# Patient Record
Sex: FEMALE | Race: WHITE | NOT HISPANIC OR LATINO | URBAN - METROPOLITAN AREA
[De-identification: names, ages, dates, MRNs, and addresses within clinical notes are randomized per-mention and may not be internally consistent; named-entity substitution may affect disease eponyms.]

---

## 2020-01-01 ENCOUNTER — OFFICE VISIT - HEALTHEAST (OUTPATIENT)
Dept: GERIATRICS | Facility: CLINIC | Age: 85
End: 2020-01-01

## 2020-01-01 ENCOUNTER — RECORDS - HEALTHEAST (OUTPATIENT)
Dept: LAB | Facility: CLINIC | Age: 85
End: 2020-01-01

## 2020-01-01 ENCOUNTER — COMMUNICATION - HEALTHEAST (OUTPATIENT)
Dept: GERIATRICS | Facility: CLINIC | Age: 85
End: 2020-01-01

## 2020-01-01 ENCOUNTER — AMBULATORY - HEALTHEAST (OUTPATIENT)
Dept: ADMINISTRATIVE | Facility: CLINIC | Age: 85
End: 2020-01-01

## 2020-01-01 DIAGNOSIS — F32.A DEPRESSIVE DISORDER: ICD-10-CM

## 2020-01-01 DIAGNOSIS — R53.81 PHYSICAL DECONDITIONING: ICD-10-CM

## 2020-01-01 DIAGNOSIS — F03.91 MAJOR NEUROCOGNITIVE DISORDER DUE TO ALZHEIMER'S DISEASE, PROBABLE, WITH BEHAVIORAL DISTURBANCE: ICD-10-CM

## 2020-01-01 DIAGNOSIS — I10 ESSENTIAL HYPERTENSION: ICD-10-CM

## 2020-01-01 DIAGNOSIS — F41.9 ANXIETY: ICD-10-CM

## 2020-01-01 DIAGNOSIS — R52 PAIN: ICD-10-CM

## 2020-01-01 DIAGNOSIS — Z79.899 MEDICATION MANAGEMENT: ICD-10-CM

## 2020-01-01 LAB
ALBUMIN SERPL-MCNC: 3.2 G/DL (ref 3.5–5)
ANION GAP SERPL CALCULATED.3IONS-SCNC: 9 MMOL/L (ref 5–18)
BASOPHILS # BLD AUTO: 0.1 THOU/UL (ref 0–0.2)
BASOPHILS NFR BLD AUTO: 1 % (ref 0–2)
BUN SERPL-MCNC: 11 MG/DL (ref 8–28)
CALCIUM SERPL-MCNC: 8.6 MG/DL (ref 8.5–10.5)
CHLORIDE BLD-SCNC: 101 MMOL/L (ref 98–107)
CO2 SERPL-SCNC: 22 MMOL/L (ref 22–31)
CREAT SERPL-MCNC: 0.81 MG/DL (ref 0.6–1.1)
EOSINOPHIL COUNT (ABSOLUTE): 0.1 THOU/UL (ref 0–0.4)
EOSINOPHIL NFR BLD AUTO: 2 % (ref 0–6)
ERYTHROCYTE [DISTWIDTH] IN BLOOD BY AUTOMATED COUNT: 22.6 % (ref 11–14.5)
GFR SERPL CREATININE-BSD FRML MDRD: >60 ML/MIN/1.73M2
GLUCOSE BLD-MCNC: 117 MG/DL (ref 70–125)
HCT VFR BLD AUTO: 31.3 % (ref 35–47)
HGB BLD-MCNC: 9.7 G/DL (ref 12–16)
IMMATURE GRANULOCYTE % - MAN (DIFF): 0 %
IMMATURE GRANULOCYTE ABSOLUTE - MAN (DIFF): 0 THOU/UL
LYMPHOCYTES # BLD AUTO: 0.8 THOU/UL (ref 0.8–4.4)
LYMPHOCYTES NFR BLD AUTO: 13 % (ref 20–40)
MCH RBC QN AUTO: 27.2 PG (ref 27–34)
MCHC RBC AUTO-ENTMCNC: 31 G/DL (ref 32–36)
MCV RBC AUTO: 88 FL (ref 80–100)
MONOCYTES # BLD AUTO: 1.7 THOU/UL (ref 0–0.9)
MONOCYTES NFR BLD AUTO: 26 % (ref 2–10)
OVALOCYTES: ABNORMAL
PHOSPHATE SERPL-MCNC: 3.3 MG/DL (ref 2.5–4.5)
PLAT MORPH BLD: NORMAL
PLATELET # BLD AUTO: 182 THOU/UL (ref 140–440)
PMV BLD AUTO: 10.4 FL (ref 8.5–12.5)
POLYCHROMASIA BLD QL SMEAR: ABNORMAL
POTASSIUM BLD-SCNC: 4.2 MMOL/L (ref 3.5–5)
RBC # BLD AUTO: 3.56 MILL/UL (ref 3.8–5.4)
SCHISTOCYTES: ABNORMAL
SODIUM SERPL-SCNC: 132 MMOL/L (ref 136–145)
TOTAL NEUTROPHILS-ABS(DIFF): 3.8 THOU/UL (ref 2–7.7)
TOTAL NEUTROPHILS-REL(DIFF): 59 % (ref 50–70)
WBC: 6.4 THOU/UL (ref 4–11)

## 2020-01-01 RX ORDER — HALOPERIDOL 0.5 MG/1
0.5 TABLET ORAL 2 TIMES DAILY
Status: SHIPPED | COMMUNITY
Start: 2020-01-01

## 2020-01-01 RX ORDER — FERROUS SULFATE 325(65) MG
1 TABLET ORAL 2 TIMES DAILY WITH MEALS
Status: SHIPPED | COMMUNITY
Start: 2020-01-01

## 2020-01-01 RX ORDER — POLYETHYLENE GLYCOL 3350 17 G/17G
17 POWDER, FOR SOLUTION ORAL DAILY PRN
Status: SHIPPED | COMMUNITY
Start: 2020-01-01

## 2020-01-01 RX ORDER — TRAZODONE HYDROCHLORIDE 50 MG/1
25 TABLET, FILM COATED ORAL DAILY
Status: SHIPPED | COMMUNITY
Start: 2020-01-01

## 2020-01-01 RX ORDER — PANTOPRAZOLE SODIUM 40 MG/1
40 TABLET, DELAYED RELEASE ORAL 2 TIMES DAILY
Status: SHIPPED | COMMUNITY
Start: 2020-01-01

## 2020-01-01 RX ORDER — ATENOLOL 50 MG/1
50 TABLET ORAL DAILY
Status: SHIPPED | COMMUNITY
Start: 2020-01-01

## 2020-01-01 RX ORDER — ACETAMINOPHEN 325 MG/1
650 TABLET ORAL 3 TIMES DAILY
Status: SHIPPED | COMMUNITY
Start: 2020-01-01

## 2020-01-01 RX ORDER — OXYCODONE HYDROCHLORIDE 5 MG/1
2.5 TABLET ORAL 3 TIMES DAILY
Qty: 60 TABLET | Refills: 0 | Status: SHIPPED | OUTPATIENT
Start: 2020-01-01

## 2020-01-01 RX ORDER — BISACODYL 10 MG
10 SUPPOSITORY, RECTAL RECTAL DAILY PRN
Status: SHIPPED | COMMUNITY
Start: 2020-01-01

## 2020-01-01 RX ORDER — ZINC OXIDE 216 MG/ML
8 LOTION TOPICAL 2 TIMES DAILY
Status: SHIPPED | COMMUNITY
Start: 2020-01-01

## 2021-01-01 ENCOUNTER — COMMUNICATION - HEALTHEAST (OUTPATIENT)
Dept: GERIATRICS | Facility: CLINIC | Age: 86
End: 2021-01-01

## 2021-01-01 ENCOUNTER — AMBULATORY - HEALTHEAST (OUTPATIENT)
Dept: GERIATRICS | Facility: CLINIC | Age: 86
End: 2021-01-01

## 2021-01-01 ENCOUNTER — OFFICE VISIT - HEALTHEAST (OUTPATIENT)
Dept: GERIATRICS | Facility: CLINIC | Age: 86
End: 2021-01-01

## 2021-01-01 ENCOUNTER — RECORDS - HEALTHEAST (OUTPATIENT)
Dept: LAB | Facility: CLINIC | Age: 86
End: 2021-01-01

## 2021-01-01 DIAGNOSIS — I10 ESSENTIAL HYPERTENSION: ICD-10-CM

## 2021-01-01 DIAGNOSIS — F03.91 MAJOR NEUROCOGNITIVE DISORDER DUE TO ALZHEIMER'S DISEASE, PROBABLE, WITH BEHAVIORAL DISTURBANCE: ICD-10-CM

## 2021-01-01 DIAGNOSIS — F32.A DEPRESSIVE DISORDER: ICD-10-CM

## 2021-01-01 DIAGNOSIS — Z51.5 HOSPICE CARE: ICD-10-CM

## 2021-01-01 DIAGNOSIS — R53.1 WEAKNESS: ICD-10-CM

## 2021-01-01 LAB
ANION GAP SERPL CALCULATED.3IONS-SCNC: 18 MMOL/L (ref 5–18)
BASOPHILS # BLD AUTO: 0.1 THOU/UL (ref 0–0.2)
BASOPHILS NFR BLD AUTO: 0 % (ref 0–2)
BUN SERPL-MCNC: 95 MG/DL (ref 8–28)
CALCIUM SERPL-MCNC: 10.2 MG/DL (ref 8.5–10.5)
CHLORIDE BLD-SCNC: 112 MMOL/L (ref 98–107)
CO2 SERPL-SCNC: 21 MMOL/L (ref 22–31)
CREAT SERPL-MCNC: 2.53 MG/DL (ref 0.6–1.1)
EOSINOPHIL # BLD AUTO: 0 THOU/UL (ref 0–0.4)
EOSINOPHIL NFR BLD AUTO: 0 % (ref 0–6)
ERYTHROCYTE [DISTWIDTH] IN BLOOD BY AUTOMATED COUNT: 13.6 % (ref 11–14.5)
GFR SERPL CREATININE-BSD FRML MDRD: 18 ML/MIN/1.73M2
GLUCOSE BLD-MCNC: 121 MG/DL (ref 70–125)
HCT VFR BLD AUTO: 46.9 % (ref 35–47)
HGB BLD-MCNC: 15.3 G/DL (ref 12–16)
IMM GRANULOCYTES # BLD: 0.6 THOU/UL
IMM GRANULOCYTES NFR BLD: 2 %
LYMPHOCYTES # BLD AUTO: 0.6 THOU/UL (ref 0.8–4.4)
LYMPHOCYTES NFR BLD AUTO: 2 % (ref 20–40)
MCH RBC QN AUTO: 33 PG (ref 27–34)
MCHC RBC AUTO-ENTMCNC: 32.6 G/DL (ref 32–36)
MCV RBC AUTO: 101 FL (ref 80–100)
MONOCYTES # BLD AUTO: 6 THOU/UL (ref 0–0.9)
MONOCYTES NFR BLD AUTO: 18 % (ref 2–10)
NEUTROPHILS # BLD AUTO: 26.5 THOU/UL (ref 2–7.7)
NEUTROPHILS NFR BLD AUTO: 78 % (ref 50–70)
PLATELET # BLD AUTO: 237 THOU/UL (ref 140–440)
PMV BLD AUTO: 11.5 FL (ref 8.5–12.5)
POTASSIUM BLD-SCNC: 3.8 MMOL/L (ref 3.5–5)
RBC # BLD AUTO: 4.64 MILL/UL (ref 3.8–5.4)
SODIUM SERPL-SCNC: 151 MMOL/L (ref 136–145)
WBC: 33.8 THOU/UL (ref 4–11)

## 2021-01-01 ASSESSMENT — MIFFLIN-ST. JEOR: SCORE: 866.05

## 2021-02-15 ENCOUNTER — AMBULATORY - HEALTHEAST (OUTPATIENT)
Dept: OTHER | Facility: CLINIC | Age: 86
End: 2021-02-15

## 2021-05-27 VITALS
OXYGEN SATURATION: 95 % | RESPIRATION RATE: 18 BRPM | DIASTOLIC BLOOD PRESSURE: 64 MMHG | TEMPERATURE: 98 F | HEART RATE: 69 BPM | SYSTOLIC BLOOD PRESSURE: 117 MMHG

## 2021-06-05 VITALS
WEIGHT: 106.7 LBS | TEMPERATURE: 97.2 F | HEIGHT: 64 IN | OXYGEN SATURATION: 93 % | SYSTOLIC BLOOD PRESSURE: 93 MMHG | DIASTOLIC BLOOD PRESSURE: 58 MMHG | HEART RATE: 80 BPM | RESPIRATION RATE: 20 BRPM | BODY MASS INDEX: 18.22 KG/M2

## 2021-06-05 VITALS
SYSTOLIC BLOOD PRESSURE: 104 MMHG | WEIGHT: 107 LBS | OXYGEN SATURATION: 95 % | TEMPERATURE: 98 F | HEART RATE: 78 BPM | RESPIRATION RATE: 18 BRPM | DIASTOLIC BLOOD PRESSURE: 64 MMHG

## 2021-06-12 NOTE — PROGRESS NOTES
"  [facility]   Video Visit    Code Status: DNR/DNI and POLST AVAILABLE    Yenni Javier is a 89 y.o. female who is being evaluated via a billable video visit.      The patient has been notified of following:     \"This video visit will be conducted via a call between you and your physician/provider. We have found that certain health care needs can be provided without the need for an in-person physical exam.  This service lets us provide the care you need with a video conversation.  If a prescription is necessary we can send it to the facility team.  If lab work is needed we can place an order through the facility team to have that test done at a later time.    If during the course of the call the physician/provider feels a video visit is not appropriate, you will not be charged for this service.\"     Physician/provider has received verbal consent for a Video Visit from the patient? Yes    Patient would like the video invitation sent by: Send to : [phone]    Video Start Time: 0952    CHIEF COMPLAINT/REASON FOR VISIT:  Chief Complaint   Patient presents with     Problem Visit     medication issues       HISTORY:      HPI: Yenni is a 89 y.o. 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  Yenni is a long-term care resident at [facility].    Today Yenni is being evaluated by request of consultant pharmacist for medication management.  Pharmacist is requesting a reduction in ferrous sulfate dosing.  This recommendation has been agreed upon.  However upon further evaluating medications and conditions it appears that she is quite overmedicated and sleepy.  She is very groggy and has multiple medications " contributing to sedation.  This includes an afternoon dose of trazodone.  An evening dose of trazodone.  Haldol twice daily and once at night.  Staff do not report any problematic behaviors.  Again, she is mostly sleeping due to the fact that she is clearly very medically sedated.  She also has other medications that do not seem to be appropriate for a woman of her age.  She is on scheduled oxycodone, 3 times daily.  Did discuss with the nurse who states she does believe she needs the pain medication at this time.  However it was unclear why she needed the pain medication.  We will address that at a later date.  Blood pressures have well controlled. We will attempt to wean off of hydralazine. She is also on a great deal of pantoprazole, which she does not have an indication for and has zero complaints. Never has had a GI bleed from what we can see in past medical history.  No history of Zavala's esophagus.  Uncertain why the heavy dosing, will attempt to wean.  Due to patient's advanced age and multiple comorbidities, will attempt weaning medication at a slow rate.  Yenni states that she is doing fine and is agreeable to this plan.  She states she has no concerns or issues to report.  Yenni denies any other concerns including fevers/chills, cough or cold symptoms, headaches, vision changes, chest pain/pressure, difficulty breathing, SOB, abdominal pain, nausea, vomiting, diarrhea, dysuria, increasing weakness, increasing pain.     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  Social History     Socioeconomic History     Marital status: Single     Spouse name:  Not on file     Number of children: Not on file     Years of education: Not on file     Highest education level: Not on file   Occupational History     Not on file   Social Needs     Financial resource strain: Not on file     Food insecurity     Worry: Not on file     Inability: Not on file     Transportation needs     Medical: Not on file     Non-medical: Not on file   Tobacco Use     Smoking status: Former Smoker     Smokeless tobacco: Never Used   Substance and Sexual Activity     Alcohol use: Not Currently     Drug use: Not on file     Sexual activity: Not on file   Lifestyle     Physical activity     Days per week: Not on file     Minutes per session: Not on file     Stress: Not on file   Relationships     Social connections     Talks on phone: Not on file     Gets together: Not on file     Attends Protestant service: Not on file     Active member of club or organization: Not on file     Attends meetings of clubs or organizations: Not on file     Relationship status: Not on file     Intimate partner violence     Fear of current or ex partner: Not on file     Emotionally abused: Not on file     Physically abused: Not on file     Forced sexual activity: Not on file   Other Topics Concern     Not on file   Social History Narrative     Not on file       REVIEW OF SYSTEM:  Per HPI    PHYSICAL EXAM:   /64   Pulse 78   Temp 98  F (36.7  C)   Resp 18   Wt 107 lb (48.5 kg)   SpO2 95%     A limited exam was performed due to recommendations for care during COVID-19 pandemic. Due to the 2020 COVID-19 pandemic, except as noted above, the patient was visually observed at a 6 foot plus distance.  An observational exam was performed in an effort to keep patient safe from COVID-19 and other communicable diseases.     General appearance: alert, appears stated age and cooperative  HEENT: Head is normocephalic with normal hair distribution. No evidence of trauma. Ears: Without lesions or deformity. No acute purulent  discharge. Eyes: Conjunctivae pink with no scleral icterus or erythema. Nose: Normal. Oropharnyx: mmm.  Lungs: respirations without effort.  Extremities: extremities normal, atraumatic, no cyanosis.  Skin: Skin color, texture normal. No rashes or lesions on exposed skin.   Neurologic: Grossly normal   Psych: interacts well with caregivers, exhibits logical thought processes and connections with evidence of dementia, pleasant.      LABS:   None today.    ASSESSMENT:      ICD-10-CM    1. Major neurocognitive disorder due to Alzheimer's disease, probable, with behavioral disturbance (H)  F01.51    2. Essential hypertension  I10    3. Medication management  Z79.899        PLAN:    Alzheimer's disease  -Discontinue afternoon dose of trazodone.  -Plan to adjust and taper Haldol.    Hypertension  -Decrease hydralazine to 10 mg by mouth 3 times daily.  We will plan to taper and discontinue this altogether if she tolerates it.  -Encouraged cardiac diet, low sodium diet, exercise and stress reduction techniques.   -Emphasized importance of blood pressure control.   -Continue current medications as prescribed.     Medication management  -Decrease pantoprazole to 40 mg by mouth once daily for 2 weeks, then decrease to 20 mg by mouth daily for 2 weeks, then decrease to 10 mg by mouth for 2 weeks, and then discontinue altogether.  -No indication for pantoprazole noted.  -Follow up per routine schedule or sooner with any complaints or concerns.    Otherwise continue current care plan for all other chronic medical conditions, as they are stable. Encouraged patient to engage in healthy lifestyle behaviors such as engaging in social activities, exercising (PT/OT), eating well, and following care plan. Follow up for routine check-up, or sooner if needed. Will continue to monitor patient and work with nursing staff collaboratively to work toward positive patient outcomes.    Video-Visit Details    Type of service:  Video Visit    Video  End Time (time video stopped): 10 AM    Originating Location (pt. Location):[facility] [[id number]]    Distant Location (provider location):  [facility]     Mode of Communication:  Alma Zhu CNP      Electronically signed by: Leanne Zhu CNP

## 2021-06-13 NOTE — PROGRESS NOTES
"  [facility]     Code Status: DNR/DNI and POLST AVAILABLE    CHIEF COMPLAINT/REASON FOR VISIT:  Chief Complaint   Patient presents with     Review Of Multiple Medical Conditions     Dementia, HTN, Depression, Anxiety       HISTORY:      HPI: Yenni is a 89 y.o. 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  Yenni is a long-term care resident at [facility].    Today Yenni is being evaluated for a routine review of multiple medical problems while in long term care. Yenni has significant dementia and therefore is unable to fully participate in ROS/HPI. She denies pain, asks \"what are you talking about?\". She otherwise will babble. Yenni has no concerns or issues that she is able to express. Nursing staff state that recent medication adjustments have been good. No side effects or other issues from these med changes. We will continue reduction of meds with Trazodone and hydralazine. Blood pressures have been very low-- less than 100 systolic. No problems noted with sleep.     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  Social History     Socioeconomic History     Marital status: Single     Spouse name: Not on file     Number of children: Not on file     Years of education: Not on file     Highest education level: Not on file   Occupational History "     Not on file   Social Needs     Financial resource strain: Not on file     Food insecurity     Worry: Not on file     Inability: Not on file     Transportation needs     Medical: Not on file     Non-medical: Not on file   Tobacco Use     Smoking status: Former Smoker     Smokeless tobacco: Never Used   Substance and Sexual Activity     Alcohol use: Not Currently     Drug use: Not on file     Sexual activity: Not on file   Lifestyle     Physical activity     Days per week: Not on file     Minutes per session: Not on file     Stress: Not on file   Relationships     Social connections     Talks on phone: Not on file     Gets together: Not on file     Attends Anabaptism service: Not on file     Active member of club or organization: Not on file     Attends meetings of clubs or organizations: Not on file     Relationship status: Not on file     Intimate partner violence     Fear of current or ex partner: Not on file     Emotionally abused: Not on file     Physically abused: Not on file     Forced sexual activity: Not on file   Other Topics Concern     Not on file   Social History Narrative     Not on file       REVIEW OF SYSTEM:  Per HPI    PHYSICAL EXAM:   /64   Pulse 69   Temp 98  F (36.7  C)   Resp 18   SpO2 95%     A limited exam was performed due to recommendations for care during COVID-19 pandemic. Due to the 2020 COVID-19 pandemic, except as noted above, the patient was visually observed at a 6 foot plus distance.  An observational exam was performed in an effort to keep patient safe from COVID-19 and other communicable diseases.     General appearance: alert, appears stated age and cooperative  HEENT: Head is normocephalic with normal hair distribution. No evidence of trauma. Ears: Without lesions or deformity. No acute purulent discharge. Eyes: Conjunctivae pink with no scleral icterus or erythema. Nose: Normal. Oropharnyx: mmm.  Lungs: respirations without effort.  Extremities: extremities normal,  atraumatic, no cyanosis.  Skin: Skin color, texture normal. No rashes or lesions on exposed skin.   Neurologic: Grossly normal   Psych: interacts well with caregivers, exhibits logical thought processes and connections with evidence of dementia, pleasant.      LABS:   None today.    ASSESSMENT:      ICD-10-CM    1. Essential hypertension  I10    2. Major neurocognitive disorder due to Alzheimer's disease, probable, with behavioral disturbance (H)  F01.51    3. Depressive disorder  F32.9    4. Anxiety  F41.9        PLAN:    Alzheimer's disease  -Discontinue trazodone.  -Plan to adjust and taper Haldol.    Hypertension  -Discontinue hydralazine.   -Atenolol 50 mg by mouth daily.   -Encouraged cardiac diet, low sodium diet, exercise and stress reduction techniques.   -Emphasized importance of blood pressure control.   -Continue current medications as prescribed.     Otherwise continue current care plan for all other chronic medical conditions, as they are stable. Encouraged patient to engage in healthy lifestyle behaviors such as engaging in social activities, exercising (PT/OT), eating well, and following care plan. Follow up for routine check-up, or sooner if needed. Will continue to monitor patient and work with nursing staff collaboratively to work toward positive patient outcomes.    Electronically signed by: Leanne Zhu CNP

## 2021-06-13 NOTE — PROGRESS NOTES
[facility]    Facility:   [facility] [[id number]]   Code Status: POLST AVAILABLE    Admission evaluation to long-term care of 89-year-old female.  [facility], due to advanced dementia patient is unable to provide any significant history.  Last hospitalization 6/1-7/14 at [facility], had fallen from bed and with complaints of back pain.  Hemoglobin 5 on admission, evaluated by gastroenterology, no endoscopic procedures performed, continued on ferrous sulfate for significant anemia and Protonix with potential gastric source of bleeding.  Major neurocognitive disorder present, significant agitation, insomnia, stay in [place] [facility] psychiat[facility] department, on Haldol 0.5 twice daily and 1 mg at at bedtime, trazodone 25 mg at daytime 50 mg at at bedtime with daytime trazodone recently discontinued, oxycodone 2.5 mg 3 times daily and 2.5 mg 3 times daily as needed for chronic pain.  Transferred to long-term care with inability to care for self.  Previous known history of hypertension, CT scan of head with mild to moderate diffuse parenchymal volume loss and periventricular low attention UA showing sequelae of chronic microvascular disease, depression, chronic back pain, DJD of multiple joints, hypercholesterolemia, cataracts.    Past medical history, current medical problem list, drug allergies, current medication list, social history reviewed in epic.  CODE STATUS reviewed in epic.  Patient does not recall family history.    Review of systems: Fatigue present.  Denies current pain.  Feels irritable, appetite adequate.  No chest pain palpitations orthopnea or PND.  No focal neurologic deficits.  Remainder of 12 point review of systems obtained negative.    Exam: In bed, drowsy, irritable, oriented to person, blood pressure 104/64, heart rate 78, temperature 98, O2 95%, respiratory 18.  Declines physical examination.  No facial  asymmetry.  Mucous membranes moist.  Moves arms without difficulty.  No HJR.  No evidence of labored breathing.    Impression and plan:   Advanced Alzheimer's type dementia, significant agitation previously present and with [facility] geriatric psychiatry stay, Haldol 0.5 mg twice daily and 1 mg at at bedtime to be continued, recent discontinuation of a.m. trazodone, continues trazodone 50 mg at at bedtime, no evidence of psychosis at this time.    Significant anemia in July with hemoglobin 5 on admission to hospital, previously on Protonix 40 mg twice daily recently decreased to 40 mg daily, GI evaluation in hospital without endoscopic procedure performed, suspected slow GI blood loss, follow hemoglobin, continue ferrous sulfate.    Chronic pain syndrome with history of chronic back pain, DJD of multiple joints, oxycodone tolerated, nursing staff report patient appears to require oxycodone for pain management, attempt taper over upcoming months.   Deconditioning, prefers solitary bed activity.    Previous history of depression, mood irritable, denies current depression, reliability of denial unclear.    Previous history of anxiety, does not appear anxious on this occasion.    History of cataract extraction.    Hypertension on hydralazine previously 20 mg 3 times daily now 10 mg 3 times daily, blood pressure currently satisfactory, continue to monitor.   Previous CORNELIO bilateral.    History of appendectomy and SUMMER with BSO.    Hospital records reviewed, recent medical notes reviewed, review of medications, review of multiple medical issues.      Electronically signed by: Silva Sanchez MD

## 2021-06-13 NOTE — TELEPHONE ENCOUNTER
[facility] Nurse Triage Telephone Note      Provider: Silva Sanchez MD  Facility: [facility]    Facility Type: LTC    Caller: Ky  Call Back Number:  [phone]    Allergies: Ciprofloxacin and Sulfa (sulfonamide antibiotics)    Reason for call: 1150am pt found on floor, she has a slight bruise on front of L knee. Denies pain.     Verbal Order/Direction given by Provider: Continue to monitor.    Provider giving order: Silva Sanchez MD    Verbal order given to: Ky Campos RN

## 2021-06-14 NOTE — TELEPHONE ENCOUNTER
[facility] Nurse Triage Telephone Note      Provider: KAROL Verma  Facility: [facility]    Facility Type: LTC    Caller: Mansoor   Call Back Number:  [phone]    Allergies: Ciprofloxacin and Sulfa (sulfonamide antibiotics)    Reason for call: Nursing calling stating that the pt is having low BP's without symptoms, her BP this afternoon was 80/49 p 85. She currently is on hydralazine 10mg three times a day and atenolol 50mg daily. Her BP's have been running lower lately in the low 100's and 90's/     Verbal Order/Direction given by Provider: discontinue the hydralazine, place a hold parameter to hold atenolol if SBP <100.      Provider giving order: KAROL Verma    Verbal order given to: Mansoor Fung RN

## 2021-06-14 NOTE — PROGRESS NOTES
[facility]    Facility:   [facility] [[id number]]   Code Status: POLST AVAILABLE    89-year-old long-term care resident is seen for review of multiple medical problems.  History of advanced dementia, chronic anxiety, DJD, hypertension and depression.  Continues atenolol for hypertension, recent discontinuation of hydralazine, tapering off Haldol.    Review of systems: Patient questions who physician is and why person is visiting in the middle of the night,( it is early afternoon at time of visit).  Denies pain.  Declines to further participate in review of systems which is therefore not available.    Nursing staff report patient hemodynamically stable and without current behavioral abnormalities, frequently declines care.    Exam: Blood pressure 117/64, heart rate 69, temperature 98, O2 95%.  Lying supine in bed in no apparent distress.  No evidence of respiratory distress.  Thin stature.  Exam is visual in view of current viral pandemic.    Impression and plan:   Advanced dementia, irritability with history of depression and anxiety, tapering off Haldol, recent discontinuation of trazodone, per nursing report satisfactory behavior.    Hypertension on atenolol, off hydralazine, blood pressure control satisfactory.    DJD historically, no current complaints of pain.      Electronically signed by: Silva Sanchez MD

## 2021-06-15 NOTE — PROGRESS NOTES
"  [facility]     Code Status: DNR/DNI and POLST AVAILABLE    CHIEF COMPLAINT/REASON FOR VISIT:  Chief Complaint   Patient presents with     Problem Visit     Weakness       HISTORY:      HPI: Yenni is a 89 y.o. 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  Yenni is a long-term care resident at [facility].    Today Yenni is being evaluated by request of nursing staff for weakness and \"not doing well\". They did call for orders yesterday. CBC and BMP reveal critical results. Nursing staff did not perform cath for a UA/UC. Yenni appears to be actively dying. Immediate call to guardian Reva was placed. Reva was updated on the situation and agrees to follow her DNR/DNI with selective cares order. We discussed hospice at length, she is agreeable to using this service. Yenni has been unable to participate in ROS/HPI because of her advanced illness. Likely septic-- would guess UTI given she does not have any other signs of illness. We will provide comfort and pain control for her. No other psychosocial issues at this time. No other family per guardian.    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  Social History     Socioeconomic History     Marital status: Single     Spouse name: Not on file     Number of children: Not on file     Years of " "education: Not on file     Highest education level: Not on file   Occupational History     Not on file   Social Needs     Financial resource strain: Not on file     Food insecurity     Worry: Not on file     Inability: Not on file     Transportation needs     Medical: Not on file     Non-medical: Not on file   Tobacco Use     Smoking status: Former Smoker     Smokeless tobacco: Never Used   Substance and Sexual Activity     Alcohol use: Not Currently     Drug use: Not on file     Sexual activity: Not on file   Lifestyle     Physical activity     Days per week: Not on file     Minutes per session: Not on file     Stress: Not on file   Relationships     Social connections     Talks on phone: Not on file     Gets together: Not on file     Attends [employer] service: Not on file     Active member of club or organization: Not on file     Attends meetings of clubs or organizations: Not on file     Relationship status: Not on file     Intimate partner violence     Fear of current or ex partner: Not on file     Emotionally abused: Not on file     Physically abused: Not on file     Forced sexual activity: Not on file   Other Topics Concern     Not on file   Social History Narrative     Not on file       REVIEW OF SYSTEM:  Per HPI    PHYSICAL EXAM:   BP 93/58   Pulse 80   Temp 97.2  F (36.2  C)   Resp 20   Ht 5' 3.5\" (1.613 m)   Wt 106 lb 11.2 oz (48.4 kg)   SpO2 93%   BMI 18.60 kg/m      A limited exam was performed due to recommendations for care during COVID-19 pandemic. Due to the 2020 COVID-19 pandemic, except as noted above, the patient was visually observed at a 6 foot plus distance.  An observational exam was performed in an effort to keep patient safe from COVID-19 and other communicable diseases.     General appearance: cachectic  HEENT: Head is normocephalic with normal hair distribution. No evidence of trauma. Ears: Without lesions or deformity. No acute purulent discharge. Eyes: Conjunctivae pink with no " scleral icterus or erythema. Nose: Normal. Oropharnyx: mmm.  Lungs: respirations without effort but tachypneic, LS diminished throughout.  Cardiovascular: S1, S2 appreciated but heart sounds were difficult to hear.   Extremities: extremities normal, atraumatic, no cyanosis.  Skin: Skin color, texture normal. No rashes or lesions on exposed skin.   Neurologic: obtunded, unable to assess  Psych: unable to assess      LABS:   None today.    ASSESSMENT:      ICD-10-CM    1. Weakness  R53.1    2. Hospice care  Z51.5        PLAN:    Weakness, Hospice Care  -Patient is clearly in active phase of dying.   -Nursing staff has yet to obtain UA/UC.   -Did call guardian, Reva, who after being brought up to speed on patient's condition agrees that hospice is the best path forward for Yenni. Yenni is a DNR/DNI with limited interventions.   -We will focus on comfort at this time.   -Hospice eval and treat, rapid access to hospice has been requested and granted.   -Ativan and morphine orders left for use on an as needed basis.   -Follow carefully, anticipate death in the very near future.     Electronically signed by: Leanne Zhu, MARVEL

## 2021-06-15 NOTE — TELEPHONE ENCOUNTER
[facility] Nurse Triage Telephone Note      Provider: KAROL Verma  Facility: [facility]    Facility Type: LTC    Caller: Mansoor   Call Back Number: [phone]    Allergies: Ciprofloxacin and Sulfa (sulfonamide antibiotics)    Reason for call: Nursing is calling stating that the pt is having a hard time sitting up on the edge of the bed and needing assistance than usually. The pt is also having a hard time swallowing her pill and drinking thin liquids.   VS stable, Neuro's WNL.      Verbal Order/Direction given by Provider: ST eval and treat, UA/UC, BMP and CBC tomorrow.     Provider giving order: KAROL Verma    Verbal order given to: Robert Fung RN

## 2021-06-16 PROBLEM — R53.1 WEAKNESS: Status: ACTIVE | Noted: 2021-02-14

## 2021-06-16 PROBLEM — Z51.5 HOSPICE CARE: Status: ACTIVE | Noted: 2021-02-14

## 2021-06-21 NOTE — LETTER
Letter by Silva Sanchez MD at      Author: Silva Sanchez MD Service: -- Author Type: --    Filed:  Encounter Date: 1/22/2021 Status: (Other)         Patient: Yenni Javier   MR Number: [id number]   YOB: 1928   Date of Visit: 1/22/2021     [facility]    Facility:   [facility] [[id number]]   Code Status: POLST AVAILABLE    89-year-old long-term care resident is seen for review of multiple medical problems.  History of advanced dementia, chronic anxiety, DJD, hypertension and depression.  Continues atenolol for hypertension, recent discontinuation of hydralazine, tapering off Haldol.    Review of systems: Patient questions who physician is and why person is visiting in the middle of the night,( it is early afternoon at time of visit).  Denies pain.  Declines to further participate in review of systems which is therefore not available.    Nursing staff report patient hemodynamically stable and without current behavioral abnormalities, frequently declines care.    Exam: Blood pressure 117/64, heart rate 69, temperature 98, O2 95%.  Lying supine in bed in no apparent distress.  No evidence of respiratory distress.  Thin stature.  Exam is visual in view of current viral pandemic.    Impression and plan:   Advanced dementia, irritability with history of depression and anxiety, tapering off Haldol, recent discontinuation of trazodone, per nursing report satisfactory behavior.    Hypertension on atenolol, off hydralazine, blood pressure control satisfactory.    DJD historically, no current complaints of pain.      Electronically signed by: Silva Sanchez MD

## 2021-06-21 NOTE — LETTER
"Letter by Leanne Zhu CNP at      Author: Leanne Zhu CNP Service: -- Author Type: --    Filed:  Encounter Date: 2/10/2021 Status: (Other)         [facility]  [address] [address]                                  February 14, 2021    Patient: Yenni Javier   MR Number: [id number]   YOB: 1928   Date of Visit: 2/10/2021     Dear Dr. Home:    Thank you for referring Yenni Javier to me for evaluation. Below are the relevant portions of my assessment and plan of care.    If you have questions, please do not hesitate to call me. I look forward to following Yenni along with you.    Sincerely,        Leanne Zhu CNP          CC  No Recipients  Leanne Zhu CNP  2/14/2021  9:49 AM  Signed    [facility]     Code Status: DNR/DNI and POLST AVAILABLE    CHIEF COMPLAINT/REASON FOR VISIT:  Chief Complaint   Patient presents with   ? Problem Visit     Weakness       HISTORY:      HPI: Yenni is a 89 y.o. 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  Yenni is a long-term care resident at [facility].    Today Yenni is being evaluated by request of nursing staff for weakness and \"not doing well\". They did call for orders yesterday. CBC and BMP reveal critical results. Nursing staff did not perform cath for a UA/UC. Yenni appears to be actively dying. Immediate call to guardian Reva was placed. Reva was updated on the situation and agrees to follow her DNR/DNI with selective cares order. We discussed hospice at length, she is agreeable to using this service. Yenni has been unable to participate in " ROS/HPI because of her advanced illness. Likely septic-- would guess UTI given she does not have any other signs of illness. We will provide comfort and pain control for her. No other psychosocial issues at this time. No other family per guardian.    Past Medical History:   Diagnosis Date   ? Acute cystitis    ? Anxiety    ? Arthritis    ? Astigmatism    ? Cataract    ? Depressive disorder    ? HLD (hyperlipidemia)    ? HTN (hypertension)    ? Major neurocognitive disorder due to Alzheimer's disease, probable, with behavioral disturbance (H)    ? Myopia    ? Osteoarthrosis    ? Presbyopia    ? Psychosis (H)              No family history on file.  Social History     Socioeconomic History   ? Marital status: Single     Spouse name: Not on file   ? Number of children: Not on file   ? Years of education: Not on file   ? Highest education level: Not on file   Occupational History   ? Not on file   Social Needs   ? Financial resource strain: Not on file   ? Food insecurity     Worry: Not on file     Inability: Not on file   ? Transportation needs     Medical: Not on file     Non-medical: Not on file   Tobacco Use   ? Smoking status: Former Smoker   ? Smokeless tobacco: Never Used   Substance and Sexual Activity   ? Alcohol use: Not Currently   ? Drug use: Not on file   ? Sexual activity: Not on file   Lifestyle   ? Physical activity     Days per week: Not on file     Minutes per session: Not on file   ? Stress: Not on file   Relationships   ? Social connections     Talks on phone: Not on file     Gets together: Not on file     Attends [employer] service: Not on file     Active member of club or organization: Not on file     Attends meetings of clubs or organizations: Not on file     Relationship status: Not on file   ? Intimate partner violence     Fear of current or ex partner: Not on file     Emotionally abused: Not on file     Physically abused: Not on file     Forced sexual activity: Not on file   Other Topics Concern  "  ? Not on file   Social History Narrative   ? Not on file       REVIEW OF SYSTEM:  Per HPI    PHYSICAL EXAM:   BP 93/58   Pulse 80   Temp 97.2  F (36.2  C)   Resp 20   Ht 5' 3.5\" (1.613 m)   Wt 106 lb 11.2 oz (48.4 kg)   SpO2 93%   BMI 18.60 kg/m      A limited exam was performed due to recommendations for care during COVID-19 pandemic. Due to the 2020 COVID-19 pandemic, except as noted above, the patient was visually observed at a 6 foot plus distance.  An observational exam was performed in an effort to keep patient safe from COVID-19 and other communicable diseases.     General appearance: cachectic  HEENT: Head is normocephalic with normal hair distribution. No evidence of trauma. Ears: Without lesions or deformity. No acute purulent discharge. Eyes: Conjunctivae pink with no scleral icterus or erythema. Nose: Normal. Oropharnyx: mmm.  Lungs: respirations without effort but tachypneic, LS diminished throughout.  Cardiovascular: S1, S2 appreciated but heart sounds were difficult to hear.   Extremities: extremities normal, atraumatic, no cyanosis.  Skin: Skin color, texture normal. No rashes or lesions on exposed skin.   Neurologic: obtunded, unable to assess  Psych: unable to assess      LABS:   None today.    ASSESSMENT:      ICD-10-CM    1. Weakness  R53.1    2. Hospice care  Z51.5        PLAN:    Weakness, Hospice Care  -Patient is clearly in active phase of dying.   -Nursing staff has yet to obtain UA/UC.   -Did call guardian, Reva, who after being brought up to speed on patient's condition agrees that hospice is the best path forward for Yenni. Yenni is a DNR/DNI with limited interventions.   -We will focus on comfort at this time.   -Hospice eval and treat, rapid access to hospice has been requested and granted.   -Ativan and morphine orders left for use on an as needed basis.   -Follow carefully, anticipate death in the very near future.     Electronically signed by: Leanne Zhu CNP         "

## 2021-06-21 NOTE — LETTER
"Letter by Leanne Zhu CNP at      Author: Leanne Zhu CNP Service: -- Author Type: --    Filed:  Encounter Date: 11/5/2020 Status: (Other)         Patient: Yenni Javier   MR Number: [id number]   YOB: 1928   Date of Visit: 11/5/2020       [facility]   Video Visit    Code Status: DNR/DNI and POLST AVAILABLE    Yenni Javier is a 89 y.o. female who is being evaluated via a billable video visit.      The patient has been notified of following:     \"This video visit will be conducted via a call between you and your physician/provider. We have found that certain health care needs can be provided without the need for an in-person physical exam.  This service lets us provide the care you need with a video conversation.  If a prescription is necessary we can send it to the facility team.  If lab work is needed we can place an order through the facility team to have that test done at a later time.    If during the course of the call the physician/provider feels a video visit is not appropriate, you will not be charged for this service.\"     Physician/provider has received verbal consent for a Video Visit from the patient? Yes    Patient would like the video invitation sent by: Send to : [phone]    Video Start Time: 0952    CHIEF COMPLAINT/REASON FOR VISIT:  Chief Complaint   Patient presents with   ? Problem Visit     medication issues       HISTORY:      HPI: Yenni is a 89 y.o. 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  Yenni is a long-term care resident at [facility].    Today Yenni is being evaluated by request of consultant pharmacist for medication management.  " Pharmacist is requesting a reduction in ferrous sulfate dosing.  This recommendation has been agreed upon.  However upon further evaluating medications and conditions it appears that she is quite overmedicated and sleepy.  She is very groggy and has multiple medications contributing to sedation.  This includes an afternoon dose of trazodone.  An evening dose of trazodone.  Haldol twice daily and once at night.  Staff do not report any problematic behaviors.  Again, she is mostly sleeping due to the fact that she is clearly very medically sedated.  She also has other medications that do not seem to be appropriate for a woman of her age.  She is on scheduled oxycodone, 3 times daily.  Did discuss with the nurse who states she does believe she needs the pain medication at this time.  However it was unclear why she needed the pain medication.  We will address that at a later date.  Blood pressures have well controlled. We will attempt to wean off of hydralazine. She is also on a great deal of pantoprazole, which she does not have an indication for and has zero complaints. Never has had a GI bleed from what we can see in past medical history.  No history of Zavala's esophagus.  Uncertain why the heavy dosing, will attempt to wean.  Due to patient's advanced age and multiple comorbidities, will attempt weaning medication at a slow rate.  Yenni states that she is doing fine and is agreeable to this plan.  She states she has no concerns or issues to report.  Yenni denies any other concerns including fevers/chills, cough or cold symptoms, headaches, vision changes, chest pain/pressure, difficulty breathing, SOB, abdominal pain, nausea, vomiting, diarrhea, dysuria, increasing weakness, increasing pain.     Past Medical History:   Diagnosis Date   ? Acute cystitis    ? Anxiety    ? Arthritis    ? Astigmatism    ? Cataract    ? Depressive disorder    ? HLD (hyperlipidemia)    ? HTN (hypertension)    ? Major neurocognitive  disorder due to Alzheimer's disease, probable, with behavioral disturbance (H)    ? Myopia    ? Osteoarthrosis    ? Presbyopia    ? Psychosis (H)              No family history on file.  Social History     Socioeconomic History   ? Marital status: Single     Spouse name: Not on file   ? Number of children: Not on file   ? Years of education: Not on file   ? Highest education level: Not on file   Occupational History   ? Not on file   Social Needs   ? Financial resource strain: Not on file   ? Food insecurity     Worry: Not on file     Inability: Not on file   ? Transportation needs     Medical: Not on file     Non-medical: Not on file   Tobacco Use   ? Smoking status: Former Smoker   ? Smokeless tobacco: Never Used   Substance and Sexual Activity   ? Alcohol use: Not Currently   ? Drug use: Not on file   ? Sexual activity: Not on file   Lifestyle   ? Physical activity     Days per week: Not on file     Minutes per session: Not on file   ? Stress: Not on file   Relationships   ? Social connections     Talks on phone: Not on file     Gets together: Not on file     Attends [employer] service: Not on file     Active member of club or organization: Not on file     Attends meetings of clubs or organizations: Not on file     Relationship status: Not on file   ? Intimate partner violence     Fear of current or ex partner: Not on file     Emotionally abused: Not on file     Physically abused: Not on file     Forced sexual activity: Not on file   Other Topics Concern   ? Not on file   Social History Narrative   ? Not on file       REVIEW OF SYSTEM:  Per HPI    PHYSICAL EXAM:   /64   Pulse 78   Temp 98  F (36.7  C)   Resp 18   Wt 107 lb (48.5 kg)   SpO2 95%     A limited exam was performed due to recommendations for care during COVID-19 pandemic. Due to the 2020 COVID-19 pandemic, except as noted above, the patient was visually observed at a 6 foot plus distance.  An observational exam was performed in an effort to  keep patient safe from COVID-19 and other communicable diseases.     General appearance: alert, appears stated age and cooperative  HEENT: Head is normocephalic with normal hair distribution. No evidence of trauma. Ears: Without lesions or deformity. No acute purulent discharge. Eyes: Conjunctivae pink with no scleral icterus or erythema. Nose: Normal. Oropharnyx: mmm.  Lungs: respirations without effort.  Extremities: extremities normal, atraumatic, no cyanosis.  Skin: Skin color, texture normal. No rashes or lesions on exposed skin.   Neurologic: Grossly normal   Psych: interacts well with caregivers, exhibits logical thought processes and connections with evidence of dementia, pleasant.      LABS:   None today.    ASSESSMENT:      ICD-10-CM    1. Major neurocognitive disorder due to Alzheimer's disease, probable, with behavioral disturbance (H)  F01.51    2. Essential hypertension  I10    3. Medication management  Z79.899        PLAN:    Alzheimer's disease  -Discontinue afternoon dose of trazodone.  -Plan to adjust and taper Haldol.    Hypertension  -Decrease hydralazine to 10 mg by mouth 3 times daily.  We will plan to taper and discontinue this altogether if she tolerates it.  -Encouraged cardiac diet, low sodium diet, exercise and stress reduction techniques.   -Emphasized importance of blood pressure control.   -Continue current medications as prescribed.     Medication management  -Decrease pantoprazole to 40 mg by mouth once daily for 2 weeks, then decrease to 20 mg by mouth daily for 2 weeks, then decrease to 10 mg by mouth for 2 weeks, and then discontinue altogether.  -No indication for pantoprazole noted.  -Follow up per routine schedule or sooner with any complaints or concerns.    Otherwise continue current care plan for all other chronic medical conditions, as they are stable. Encouraged patient to engage in healthy lifestyle behaviors such as engaging in social activities, exercising (PT/OT),  eating well, and following care plan. Follow up for routine check-up, or sooner if needed. Will continue to monitor patient and work with nursing staff collaboratively to work toward positive patient outcomes.    Video-Visit Details    Type of service:  Video Visit    Video End Time (time video stopped): 10 AM    Originating Location (pt. Location):[facility] [[id number]]    Distant Location (provider location):  [facility]     Mode of Communication:  Alma Zhu CNP      Electronically signed by: Leanne Zhu CNP

## 2021-06-21 NOTE — LETTER
"Letter by Leanne Zhu CNP at      Author: Leanne Zhu CNP Service: -- Author Type: --    Filed:  Encounter Date: 12/11/2020 Status: (Other)         [facility]  [address] [address]                                  December 15, 2020    Patient: Yenni Javier   MR Number: [id number]   YOB: 1928   Date of Visit: 12/11/2020     Dear Dr. Home:    Thank you for referring Yenni Javier to me for evaluation. Below are the relevant portions of my assessment and plan of care.    If you have questions, please do not hesitate to call me. I look forward to following Yenni along with you.    Sincerely,        Leanne Zhu CNP          CC  No Recipients  Leanne Zhu CNP  12/15/2020  9:15 PM  [facility]   Video Visit    Code Status: DNR/DNI and POLST AVAILABLE    Yenni Javier is a 89 y.o. female who is being evaluated via a billable video visit.      The patient has been notified of following:     \"This video visit will be conducted via a call between you and your physician/provider. We have found that certain health care needs can be provided without the need for an in-person physical exam.  This service lets us provide the care you need with a video conversation.  If a prescription is necessary we can send it to the facility team.  If lab work is needed we can place an order through the facility team to have that test done at a later time.    If during the course of the call the physician/provider feels a video visit is not appropriate, you will not be charged for this service.\"     Physician/provider has received verbal consent for a Video Visit from the patient? Yes    Patient would like the video invitation sent by: Send to : [phone]    Video Start Time: 0952    CHIEF COMPLAINT/REASON FOR VISIT:  Chief Complaint   Patient presents with   ? Review Of Multiple Medical " "Conditions     Dementia, HTN, Depression, Anxiety       HISTORY:      HPI: Yenni is a 89 y.o. 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  Yenni is a long-term care resident at [facility].    Today Yenni is being evaluated for a routine review of multiple medical problems while in long term care. Yenni has significant dementia and therefore is unable to fully participate in ROS/HPI. She denies pain, asks \"what are you talking about?\". She otherwise will babble. Yenni has no concerns or issues that she is able to express. Nursing staff state that recent medication adjustments have been good. No side effects or other issues from these med changes. We will continue reduction of meds with Trazodone and hydralazine. Blood pressures have been very low-- less than 100 systolic. No problems noted with sleep.     Past Medical History:   Diagnosis Date   ? Acute cystitis    ? Anxiety    ? Arthritis    ? Astigmatism    ? Cataract    ? Depressive disorder    ? HLD (hyperlipidemia)    ? HTN (hypertension)    ? Major neurocognitive disorder due to Alzheimer's disease, probable, with behavioral disturbance (H)    ? Myopia    ? Osteoarthrosis    ? Presbyopia    ? Psychosis (H)              No family history on file.  Social History     Socioeconomic History   ? Marital status: Single     Spouse name: Not on file   ? Number of children: Not on file   ? Years of education: Not on file   ? Highest education level: Not on file   Occupational History   ? Not on file   Social Needs   ? Financial resource strain: Not on file   ? Food insecurity     Worry: Not on file     Inability: Not on file   ? Transportation needs     Medical: Not on " file     Non-medical: Not on file   Tobacco Use   ? Smoking status: Former Smoker   ? Smokeless tobacco: Never Used   Substance and Sexual Activity   ? Alcohol use: Not Currently   ? Drug use: Not on file   ? Sexual activity: Not on file   Lifestyle   ? Physical activity     Days per week: Not on file     Minutes per session: Not on file   ? Stress: Not on file   Relationships   ? Social connections     Talks on phone: Not on file     Gets together: Not on file     Attends [employer] service: Not on file     Active member of club or organization: Not on file     Attends meetings of clubs or organizations: Not on file     Relationship status: Not on file   ? Intimate partner violence     Fear of current or ex partner: Not on file     Emotionally abused: Not on file     Physically abused: Not on file     Forced sexual activity: Not on file   Other Topics Concern   ? Not on file   Social History Narrative   ? Not on file       REVIEW OF SYSTEM:  Per HPI    PHYSICAL EXAM:   /64   Pulse 69   Temp 98  F (36.7  C)   Resp 18   SpO2 95%     A limited exam was performed due to recommendations for care during COVID-19 pandemic. Due to the 2020 COVID-19 pandemic, except as noted above, the patient was visually observed at a 6 foot plus distance.  An observational exam was performed in an effort to keep patient safe from COVID-19 and other communicable diseases.     General appearance: alert, appears stated age and cooperative  HEENT: Head is normocephalic with normal hair distribution. No evidence of trauma. Ears: Without lesions or deformity. No acute purulent discharge. Eyes: Conjunctivae pink with no scleral icterus or erythema. Nose: Normal. Oropharnyx: mmm.  Lungs: respirations without effort.  Extremities: extremities normal, atraumatic, no cyanosis.  Skin: Skin color, texture normal. No rashes or lesions on exposed skin.   Neurologic: Grossly normal   Psych: interacts well with caregivers, exhibits logical  thought processes and connections with evidence of dementia, pleasant.      LABS:   None today.    ASSESSMENT:      ICD-10-CM    1. Essential hypertension  I10    2. Major neurocognitive disorder due to Alzheimer's disease, probable, with behavioral disturbance (H)  F01.51    3. Depressive disorder  F32.9    4. Anxiety  F41.9        PLAN:    Alzheimer's disease  -Discontinue trazodone.  -Plan to adjust and taper Haldol.    Hypertension  -Discontinue hydralazine.   -Atenolol 50 mg by mouth daily.   -Encouraged cardiac diet, low sodium diet, exercise and stress reduction techniques.   -Emphasized importance of blood pressure control.   -Continue current medications as prescribed.     Otherwise continue current care plan for all other chronic medical conditions, as they are stable. Encouraged patient to engage in healthy lifestyle behaviors such as engaging in social activities, exercising (PT/OT), eating well, and following care plan. Follow up for routine check-up, or sooner if needed. Will continue to monitor patient and work with nursing staff collaboratively to work toward positive patient outcomes.    Electronically signed by: Leanne Zhu CNP

## 2021-06-21 NOTE — LETTER
Letter by Silva Sanchez MD at      Author: Silva Sanchez MD Service: -- Author Type: --    Filed:  Encounter Date: 11/13/2020 Status: (Other)         Patient: Yenni Javier   MR Number: [id number]   YOB: 1928   Date of Visit: 11/13/2020     [facility]    Facility:   [facility] [[id number]]   Code Status: POLST AVAILABLE    Admission evaluation to long-term care of 89-year-old female.  History is taken from previous [facility] medical notes, due to advanced dementia patient is unable to provide any significant history.  Last hospitalization 6/1-7/14 at [facility], had fallen from bed and with complaints of back pain.  Hemoglobin 5 on admission, evaluated by gastroenterology, no endoscopic procedures performed, continued on ferrous sulfate for significant anemia and Protonix with potential gastric source of bleeding.  Major neurocognitive disorder present, significant agitation, insomnia, stay in [facility] department, on Haldol 0.5 twice daily and 1 mg at at bedtime, trazodone 25 mg at daytime 50 mg at at bedtime with daytime trazodone recently discontinued, oxycodone 2.5 mg 3 times daily and 2.5 mg 3 times daily as needed for chronic pain.  Transferred to long-term care with inability to care for self.  Previous known history of hypertension, CT scan of head with mild to moderate diffuse parenchymal volume loss and periventricular low attention UA showing sequelae of chronic microvascular disease, depression, chronic back pain, DJD of multiple joints, hypercholesterolemia, cataracts.    Past medical history, current medical problem list, drug allergies, current medication list, social history reviewed in epic.  CODE STATUS reviewed in epic.  Patient does not recall family history.    Review of systems: Fatigue present.  Denies current pain.  Feels irritable, appetite adequate.  No chest pain palpitations orthopnea or PND.   No focal neurologic deficits.  Remainder of 12 point review of systems obtained negative.    Exam: In bed, drowsy, irritable, oriented to person, blood pressure 104/64, heart rate 78, temperature 98, O2 95%, respiratory 18.  Declines physical examination.  No facial asymmetry.  Mucous membranes moist.  Moves arms without difficulty.  No HJR.  No evidence of labored breathing.    Impression and plan:   Advanced Alzheimer's type dementia, significant agitation previously present and with [facility] geriatric psychiatry stay, Haldol 0.5 mg twice daily and 1 mg at at bedtime to be continued, recent discontinuation of a.m. trazodone, continues trazodone 50 mg at at bedtime, no evidence of psychosis at this time.    Significant anemia in July with hemoglobin 5 on admission to hospital, previously on Protonix 40 mg twice daily recently decreased to 40 mg daily, GI evaluation in hospital without endoscopic procedure performed, suspected slow GI blood loss, follow hemoglobin, continue ferrous sulfate.    Chronic pain syndrome with history of chronic back pain, DJD of multiple joints, oxycodone tolerated, nursing staff report patient appears to require oxycodone for pain management, attempt taper over upcoming months.   Deconditioning, prefers solitary bed activity.    Previous history of depression, mood irritable, denies current depression, reliability of denial unclear.    Previous history of anxiety, does not appear anxious on this occasion.    History of cataract extraction.    Hypertension on hydralazine previously 20 mg 3 times daily now 10 mg 3 times daily, blood pressure currently satisfactory, continue to monitor.   Previous CORNELIO bilateral.    History of appendectomy and [facility] with BSO.    Hospital records reviewed, recent medical notes reviewed, review of medications, review of multiple medical issues.      Electronically signed by: Silva Sanchez MD